# Patient Record
Sex: MALE | Race: WHITE | NOT HISPANIC OR LATINO | Employment: FULL TIME | URBAN - METROPOLITAN AREA
[De-identification: names, ages, dates, MRNs, and addresses within clinical notes are randomized per-mention and may not be internally consistent; named-entity substitution may affect disease eponyms.]

---

## 2017-11-13 ENCOUNTER — OFFICE VISIT (OUTPATIENT)
Dept: MEDICAL GROUP | Facility: MEDICAL CENTER | Age: 32
End: 2017-11-13
Payer: COMMERCIAL

## 2017-11-13 VITALS
DIASTOLIC BLOOD PRESSURE: 100 MMHG | OXYGEN SATURATION: 96 % | HEIGHT: 71 IN | WEIGHT: 229 LBS | HEART RATE: 66 BPM | SYSTOLIC BLOOD PRESSURE: 150 MMHG | TEMPERATURE: 98.3 F | BODY MASS INDEX: 32.06 KG/M2

## 2017-11-13 DIAGNOSIS — D22.9 ATYPICAL NEVUS: ICD-10-CM

## 2017-11-13 DIAGNOSIS — Z76.89 ENCOUNTER TO ESTABLISH CARE: ICD-10-CM

## 2017-11-13 DIAGNOSIS — E66.9 OBESITY (BMI 30-39.9): ICD-10-CM

## 2017-11-13 DIAGNOSIS — M54.32 SCIATICA OF LEFT SIDE: ICD-10-CM

## 2017-11-13 DIAGNOSIS — J06.9 ACUTE URI: ICD-10-CM

## 2017-11-13 PROCEDURE — 99204 OFFICE O/P NEW MOD 45 MIN: CPT | Performed by: PHYSICIAN ASSISTANT

## 2017-11-13 ASSESSMENT — PATIENT HEALTH QUESTIONNAIRE - PHQ9: CLINICAL INTERPRETATION OF PHQ2 SCORE: 0

## 2017-11-13 NOTE — ASSESSMENT & PLAN NOTE
This is a 32-year-old male who is here today to establish care. He has a one-week history of sinus congestion drainage and bilateral ear pain. Associated cough that causes chest pain. Denies any fevers, shortness of breath or chest pain. Has been taking no significant medications to help with the symptoms. Wife wanted him to get checked out.

## 2017-11-13 NOTE — ASSESSMENT & PLAN NOTE
He works as a  at a local high school. Also as a . Graduated from [employer]. Is  without children. Grew up in [place].

## 2017-11-13 NOTE — ASSESSMENT & PLAN NOTE
He complains of a chronic history of sciatica on the left side. Previously he has seen a chiropractor. Symptoms did improve. He does exercises routinely if needed.

## 2017-11-13 NOTE — PROGRESS NOTES
"Subjective:   Bang Lezama is a 32 y.o. male here today for establishing care and for bilateral ear pain and pain when he coughs for one week.    Acute URI  This is a 32-year-old male who is here today to establish care. He has a one-week history of sinus congestion drainage and bilateral ear pain. Associated cough that causes chest pain. Denies any fevers, shortness of breath or chest pain. Has been taking no significant medications to help with the symptoms. Wife wanted him to get checked out.    Sciatica of left side  He complains of a chronic history of sciatica on the left side. Previously he has seen a chiropractor. Symptoms did improve. He does exercises routinely if needed.    Obesity (BMI 30-39.9)  He works as a  at a local high school. Also as a . Graduated from [employer]. Is  without children. Grew up in [place].    Atypical nevus  Requesting a referral to dermatology. Has some skin damage on his shoulders.       Current medicines (including changes today)  No current outpatient prescriptions on file.     No current facility-administered medications for this visit.      He  has no past medical history on file.    ROS   No shortness of breath, no abdominal pain and all other systems were reviewed and are negative.       Objective:     Blood pressure 150/100, pulse 66, temperature 36.8 °C (98.3 °F), height 1.803 m (5' 11\"), weight 103.9 kg (229 lb), SpO2 96 %. Body mass index is 31.94 kg/m².   Physical Exam:  Constitutional: Alert, no distress.  Skin: Warm, dry, good turgor, no rashes in visible areas.No precancerous lesions noted on his left shoulder.  Eye: Equal, round and reactive, conjunctiva clear, lids normal.  ENMT: Lips without lesions, good dentition, oropharynx clear.TMs bilaterally are clear.  Neck: Trachea midline, no masses.   Lymph: No cervical or supraclavicular lymphadenopathy  Respiratory: Unlabored respiratory effort, lungs clear to auscultation, no wheezes, no " jessica.  Cardiovascular: Normal S1, S2, no murmur, no edema.  Abdomen: Soft, non-tender, no masses.  Psych: Alert and oriented x3, normal affect and mood.        Assessment and Plan:   The following treatment plan was discussed    1. Acute URI  Acute, new onset condition. Ears and lungs are clear. Advised to push fluids. May consider over-the-counter medication such as an antihistamine with a nasal decongestant. May take ibuprofen 600-800 mg with food every 8 hours as needed. Advised symptoms a viral process. Not bacterial. Symptoms may last for a couple weeks longer. Follow up with any worsening symptoms such as fevers, shortness of breath or worsening chest pain.    2. Sciatica of left side  Chronic condition and stable. Continue exercises as needed. Contact me with any concerns.    3. Obesity (BMI 30-39.9)  Advised exercise routinely. Lose weight.  - Patient identified as having weight management issue.  Appropriate orders and counseling given.    4. Atypical nevus  No significant precancerous lesions noted on his upper left shoulder. Referred to dermatology per his request.  - REFERRAL TO DERMATOLOGY    5. Encounter to establish care      Followup: Return if symptoms worsen or fail to improve.    Please note that this dictation was created using voice recognition software. I have made every reasonable attempt to correct obvious errors, but I expect that there are errors of grammar and possibly content that I did not discover before finalizing the note.

## 2018-02-26 ENCOUNTER — OFFICE VISIT (OUTPATIENT)
Dept: URGENT CARE | Facility: CLINIC | Age: 33
End: 2018-02-26
Payer: COMMERCIAL

## 2018-02-26 VITALS
DIASTOLIC BLOOD PRESSURE: 86 MMHG | OXYGEN SATURATION: 99 % | TEMPERATURE: 97.1 F | BODY MASS INDEX: 31.8 KG/M2 | RESPIRATION RATE: 16 BRPM | SYSTOLIC BLOOD PRESSURE: 116 MMHG | HEIGHT: 72 IN | HEART RATE: 87 BPM | WEIGHT: 234.79 LBS

## 2018-02-26 DIAGNOSIS — H83.09 LABYRINTHITIS, UNSPECIFIED LATERALITY: ICD-10-CM

## 2018-02-26 PROCEDURE — 99213 OFFICE O/P EST LOW 20 MIN: CPT | Performed by: INTERNAL MEDICINE

## 2018-02-26 RX ORDER — MECLIZINE HYDROCHLORIDE 25 MG/1
25 TABLET ORAL 3 TIMES DAILY PRN
Qty: 30 TAB | Refills: 0 | Status: SHIPPED | OUTPATIENT
Start: 2018-02-26 | End: 2018-07-04

## 2018-02-26 ASSESSMENT — ENCOUNTER SYMPTOMS
NAUSEA: 0
TREMORS: 0
HEADACHES: 0
CONSTITUTIONAL NEGATIVE: 1
VOMITING: 0
DIARRHEA: 0
LOSS OF CONSCIOUSNESS: 0
FOCAL WEAKNESS: 0
SEIZURES: 0
PALPITATIONS: 0
SORE THROAT: 0
FEVER: 0
SHORTNESS OF BREATH: 0
MYALGIAS: 0
SINUS PAIN: 0
SPEECH CHANGE: 0
COUGH: 0
SENSORY CHANGE: 0
STRIDOR: 0
CHILLS: 0
TINGLING: 0
EYES NEGATIVE: 1
WEIGHT LOSS: 0
BLOOD IN STOOL: 0
DIZZINESS: 1

## 2018-02-26 NOTE — PROGRESS NOTES
Bang Lezama is a 33 y.o. male who presents for Otalgia (LT ear) and Loss Of Balance (started yesterday)       Patient is a very pleasant 33-year-old male who presents today with acute onset of dizziness. Patient states he awoke this morning got out of bed and lost his balance and stepped on his dog. States that he has noticed some ringing in his ears. No recent travel no recent flying. Patient denies any discharge from any of his ears. No fever shakes or chills. No nausea vomiting diarrhea.      PMH:  has no past medical history on file.  MEDS:   Current Outpatient Prescriptions:   •  meclizine (ANTIVERT) 25 MG Tab, Take 1 Tab by mouth 3 times a day as needed for Dizziness or Nausea/Vomiting., Disp: 30 Tab, Rfl: 0  ALLERGIES: No Known Allergies  SURGHX: No past surgical history on file.  SOCHX:  reports that he has never smoked. His smokeless tobacco use includes Chew. He reports that he drinks about 6.0 oz of alcohol per week . He reports that he uses drugs, including Marijuana.  FH: family history is not on file.    Review of Systems   Constitutional: Negative.  Negative for chills, fever and weight loss.   HENT: Positive for tinnitus. Negative for congestion, ear discharge, ear pain, nosebleeds, sinus pain and sore throat.    Eyes: Negative.    Respiratory: Negative for cough, shortness of breath and stridor.    Cardiovascular: Negative for chest pain, palpitations and leg swelling.   Gastrointestinal: Negative for blood in stool, diarrhea, nausea and vomiting.   Genitourinary: Negative for dysuria.   Musculoskeletal: Negative for myalgias.   Skin: Negative for rash.   Neurological: Positive for dizziness (negative headache). Negative for tingling, tremors, sensory change, speech change, focal weakness, seizures, loss of consciousness and headaches.     No Known Allergies   Objective:   /86   Pulse 87   Temp 36.2 °C (97.1 °F)   Resp 16   Ht 1.829 m (6')   Wt 106.5 kg (234 lb 12.6 oz)   SpO2 99%    BMI 31.84 kg/m²   Physical Exam   Constitutional: He is oriented to person, place, and time. He appears well-developed and well-nourished. He is active. No distress.   HENT:   Head: Normocephalic and atraumatic.   Right Ear: External ear normal.   Left Ear: External ear normal.   Mouth/Throat: Oropharynx is clear and moist. No oropharyngeal exudate.   Eyes: Conjunctivae and EOM are normal. Pupils are equal, round, and reactive to light. Right eye exhibits no discharge. Left eye exhibits no discharge. No scleral icterus.   Neck: Normal range of motion. Neck supple. No JVD present. Carotid bruit is not present. No thyroid mass and no thyromegaly present.   Cardiovascular: Normal rate, regular rhythm, S1 normal, S2 normal and normal heart sounds.  Exam reveals no friction rub.    No murmur heard.  Pulmonary/Chest: Effort normal and breath sounds normal. No respiratory distress. He has no wheezes. He has no rales.   Abdominal: Soft. Bowel sounds are normal. He exhibits no distension and no mass. There is no hepatosplenomegaly. There is no tenderness. There is no rebound and no guarding.   Musculoskeletal: Normal range of motion. He exhibits no edema.        Cervical back: Normal.   Lymphadenopathy:        Head (right side): No submental, no submandibular and no occipital adenopathy present.        Head (left side): No submental, no submandibular and no occipital adenopathy present.     He has no cervical adenopathy.   Neurological: He is alert and oriented to person, place, and time. He has normal strength. No cranial nerve deficit.   Skin: Skin is warm and dry. No rash noted. No erythema.   Psychiatric: He has a normal mood and affect. His behavior is normal. Thought content normal.         Assessment/Plan:   Assessment    1. Labyrinthitis, unspecified laterality  - meclizine (ANTIVERT) 25 MG Tab; Take 1 Tab by mouth 3 times a day as needed for Dizziness or Nausea/Vomiting.  Dispense: 30 Tab; Refill: 0  Differential  diagnosis, natural history, supportive care, and indications for immediate follow-up discussed.    By mouth fluids, Tylenol when necessary  Follow-up with PCP  Return to clinic for worsening symptoms.

## 2018-07-04 ENCOUNTER — TELEPHONE (OUTPATIENT)
Dept: URGENT CARE | Facility: CLINIC | Age: 33
End: 2018-07-04

## 2018-07-04 ENCOUNTER — OFFICE VISIT (OUTPATIENT)
Dept: URGENT CARE | Facility: CLINIC | Age: 33
End: 2018-07-04
Payer: COMMERCIAL

## 2018-07-04 ENCOUNTER — HOSPITAL ENCOUNTER (OUTPATIENT)
Dept: RADIOLOGY | Facility: MEDICAL CENTER | Age: 33
End: 2018-07-04
Attending: NURSE PRACTITIONER
Payer: COMMERCIAL

## 2018-07-04 VITALS
DIASTOLIC BLOOD PRESSURE: 100 MMHG | SYSTOLIC BLOOD PRESSURE: 140 MMHG | TEMPERATURE: 96.9 F | RESPIRATION RATE: 18 BRPM | HEART RATE: 72 BPM | WEIGHT: 238.2 LBS | OXYGEN SATURATION: 98 % | BODY MASS INDEX: 32.26 KG/M2 | HEIGHT: 72 IN

## 2018-07-04 DIAGNOSIS — R10.9 FLANK PAIN, ACUTE: ICD-10-CM

## 2018-07-04 DIAGNOSIS — R11.0 NAUSEA: ICD-10-CM

## 2018-07-04 DIAGNOSIS — R31.9 HEMATURIA, UNSPECIFIED TYPE: ICD-10-CM

## 2018-07-04 DIAGNOSIS — N20.0 KIDNEY STONE ON RIGHT SIDE: ICD-10-CM

## 2018-07-04 LAB
APPEARANCE UR: CLEAR
BILIRUB UR STRIP-MCNC: NORMAL MG/DL
COLOR UR AUTO: NORMAL
GLUCOSE UR STRIP.AUTO-MCNC: NORMAL MG/DL
KETONES UR STRIP.AUTO-MCNC: NORMAL MG/DL
LEUKOCYTE ESTERASE UR QL STRIP.AUTO: NORMAL
NITRITE UR QL STRIP.AUTO: NORMAL
PH UR STRIP.AUTO: 5 [PH] (ref 5–8)
PROT UR QL STRIP: 30 MG/DL
RBC UR QL AUTO: NORMAL
SP GR UR STRIP.AUTO: 1
UROBILINOGEN UR STRIP-MCNC: NORMAL MG/DL

## 2018-07-04 PROCEDURE — 81002 URINALYSIS NONAUTO W/O SCOPE: CPT | Performed by: NURSE PRACTITIONER

## 2018-07-04 PROCEDURE — 99214 OFFICE O/P EST MOD 30 MIN: CPT | Performed by: NURSE PRACTITIONER

## 2018-07-04 PROCEDURE — 74176 CT ABD & PELVIS W/O CONTRAST: CPT

## 2018-07-04 RX ORDER — ONDANSETRON 4 MG/1
4 TABLET, ORALLY DISINTEGRATING ORAL EVERY 8 HOURS PRN
Qty: 10 TAB | Refills: 0 | Status: SHIPPED | OUTPATIENT
Start: 2018-07-04 | End: 2022-10-11

## 2018-07-04 RX ORDER — TAMSULOSIN HYDROCHLORIDE 0.4 MG/1
0.4 CAPSULE ORAL
Qty: 30 CAP | Refills: 0 | Status: SHIPPED | OUTPATIENT
Start: 2018-07-04 | End: 2018-07-04 | Stop reason: SDUPTHER

## 2018-07-04 RX ORDER — HYDROCODONE BITARTRATE AND ACETAMINOPHEN 5; 325 MG/1; MG/1
1-2 TABLET ORAL EVERY 4 HOURS PRN
Qty: 15 TAB | Refills: 0 | Status: SHIPPED | OUTPATIENT
Start: 2018-07-04 | End: 2018-07-07

## 2018-07-04 RX ORDER — TAMSULOSIN HYDROCHLORIDE 0.4 MG/1
0.4 CAPSULE ORAL
Qty: 30 CAP | Refills: 0 | Status: SHIPPED | OUTPATIENT
Start: 2018-07-04 | End: 2022-10-11

## 2018-07-04 ASSESSMENT — ENCOUNTER SYMPTOMS
SORE THROAT: 0
FLANK PAIN: 0
WEAKNESS: 0
MYALGIAS: 0
EYE PAIN: 0
VOMITING: 0
FEVER: 0
NAUSEA: 0
BACK PAIN: 1
SHORTNESS OF BREATH: 0
NUMBNESS: 0
CHILLS: 0
DIZZINESS: 0

## 2018-07-04 NOTE — PROGRESS NOTES
Subjective:   Bang Lezama is a 33 y.o. male who presents for Blood in Urine (woke up with it )  Patient presents to clinic today with complaints of blood in his urine ×1 day. Patient denies any dysuria, discharge, STD exposure. Patient is having mild back pain however contributes to his mattress.  Denies any fever, chills. He is feeling nauseous without vomiting.  Patient denies any history of kidney stones.      Back Pain   This is a new problem. The current episode started yesterday. The problem occurs constantly. The problem is unchanged. Pain location: left/right flank  The quality of the pain is described as shooting. The pain does not radiate. The pain is at a severity of 4/10. The pain is mild. The pain is the same all the time. Exacerbated by: nothing. Stiffness is present all day. Pertinent negatives include no chest pain, dysuria, fever, numbness, pelvic pain or weakness. (Blood in urine  ) He has tried nothing for the symptoms. The treatment provided no relief.     Review of Systems   Constitutional: Negative for chills and fever.   HENT: Negative for sore throat.    Eyes: Negative for pain.   Respiratory: Negative for shortness of breath.    Cardiovascular: Negative for chest pain.   Gastrointestinal: Negative for nausea and vomiting.   Genitourinary: Positive for hematuria. Negative for dysuria, flank pain, pelvic pain and urgency.   Musculoskeletal: Positive for back pain. Negative for myalgias.   Skin: Negative for rash.   Neurological: Negative for dizziness, weakness and numbness.     No Known Allergies   Objective:   /100   Pulse 72   Temp 36.1 °C (96.9 °F)   Resp 18   Ht 1.829 m (6')   Wt 108 kg (238 lb 3.2 oz)   SpO2 98%   BMI 32.31 kg/m²   Physical Exam   Constitutional: He is oriented to person, place, and time. He appears well-developed and well-nourished. No distress.   HENT:   Head: Normocephalic and atraumatic.   Eyes: Conjunctivae and EOM are normal. Pupils are equal,  round, and reactive to light.   Cardiovascular: Normal rate and regular rhythm.    No murmur heard.  Pulmonary/Chest: Effort normal and breath sounds normal. No respiratory distress.   Abdominal: Soft. He exhibits no distension. There is no tenderness. There is CVA tenderness (left/ right).   Neurological: He is alert and oriented to person, place, and time. He has normal reflexes. No sensory deficit.   Skin: Skin is warm and dry.   Psychiatric: He has a normal mood and affect.         Assessment/Plan:   Assessment    1. Hematuria, unspecified type  2. Flank pain, acute  3. Kidney stone on right side  -4. Nausea   - ondansetron (ZOFRAN ODT) 4 MG TABLET DISPERSIBLE; Take 1 Tab by mouth every 8 hours as needed.  Dispense: 10 Tab; Refill: 0  - CT-RENAL COLIC EVALUATION(A/P W/O); Future  - POCT Urinalysis  - tamsulosin (FLOMAX) 0.4 MG capsule; Take 1 Cap by mouth ONE-HALF HOUR AFTER BREAKFAST.  Dispense: 30 Cap; Refill: 0  - HYDROcodone-acetaminophen (NORCO) 5-325 MG Tab per tablet; Take 1-2 Tabs by mouth every four hours as needed for up to 3 days.  Dispense: 15 Tab; Refill: 0  - Consent for Opiate Prescription  - REFERRAL TO UROLOGY    Patient with flank pain. Will follow up with pending CT results and treat as indicated.     CT results:  1.  There is a nonobstructive 6 mm right UPJ calcification      Will assist patient in passing of stone and follow up with urology. Advised if fever develops, severe pain, if not able to urinate for 8-12 hours must got to ER.     [employer] query was performed to review the . The patient appears appropriate to receive medication as prescribed.        Patient given precautionary s/sx that mandate immediate follow up and evaluation in the ED. Advised of risks of not doing so.    DDX, Supportive care, and indications for immediate follow-up discussed with patient.    Instructed to return to clinic or nearest emergency department if we are not available for any change in condition,  further concerns, or worsening of symptoms.    The patient demonstrated a good understanding and agreed with the treatment plan.

## 2018-07-19 ENCOUNTER — OFFICE VISIT (OUTPATIENT)
Dept: URGENT CARE | Facility: CLINIC | Age: 33
End: 2018-07-19
Payer: COMMERCIAL

## 2018-07-19 VITALS
OXYGEN SATURATION: 97 % | DIASTOLIC BLOOD PRESSURE: 72 MMHG | SYSTOLIC BLOOD PRESSURE: 118 MMHG | HEIGHT: 72 IN | WEIGHT: 231 LBS | TEMPERATURE: 97.2 F | BODY MASS INDEX: 31.29 KG/M2 | HEART RATE: 70 BPM | RESPIRATION RATE: 16 BRPM

## 2018-07-19 DIAGNOSIS — R10.9 ABDOMINAL DISCOMFORT: ICD-10-CM

## 2018-07-19 DIAGNOSIS — Z87.442 HISTORY OF KIDNEY STONES: ICD-10-CM

## 2018-07-19 LAB
APPEARANCE UR: CLEAR
BILIRUB UR STRIP-MCNC: NEGATIVE MG/DL
COLOR UR AUTO: NORMAL
GLUCOSE UR STRIP.AUTO-MCNC: NEGATIVE MG/DL
KETONES UR STRIP.AUTO-MCNC: NEGATIVE MG/DL
LEUKOCYTE ESTERASE UR QL STRIP.AUTO: NEGATIVE
NITRITE UR QL STRIP.AUTO: NEGATIVE
PH UR STRIP.AUTO: 6.5 [PH] (ref 5–8)
PROT UR QL STRIP: NORMAL MG/DL
RBC UR QL AUTO: NORMAL
SP GR UR STRIP.AUTO: 1.01
UROBILINOGEN UR STRIP-MCNC: NEGATIVE MG/DL

## 2018-07-19 PROCEDURE — 81002 URINALYSIS NONAUTO W/O SCOPE: CPT | Performed by: PHYSICIAN ASSISTANT

## 2018-07-19 PROCEDURE — 99214 OFFICE O/P EST MOD 30 MIN: CPT | Performed by: PHYSICIAN ASSISTANT

## 2018-07-19 RX ORDER — ONDANSETRON 4 MG/1
4 TABLET, FILM COATED ORAL EVERY 6 HOURS PRN
Qty: 20 TAB | Refills: 1 | Status: SHIPPED | OUTPATIENT
Start: 2018-07-19 | End: 2022-10-11

## 2018-07-19 ASSESSMENT — ENCOUNTER SYMPTOMS
COUGH: 0
BACK PAIN: 0
NAUSEA: 0
ABDOMINAL PAIN: 0
FEVER: 0
MYALGIAS: 0
FLANK PAIN: 0
SORE THROAT: 0
VOMITING: 0
WHEEZING: 0
SPUTUM PRODUCTION: 0
SHORTNESS OF BREATH: 0
CHILLS: 0
DIARRHEA: 0

## 2018-07-19 NOTE — PROGRESS NOTES
"Subjective:   Bang Lezama is a 33 y.o. male who presents for Abdominal Pain (abdominal pain lower right side x 1 day)        Abdominal Pain   This is a new problem. The current episode started today. The pain is located in the RLQ. Pertinent negatives include no diarrhea, dysuria, fever, frequency, hematuria, myalgias, nausea ( resolved) or vomiting.   notes had lithotripsy last Tuesday, PMH of kidney stone x last two weeks, noted onset abd pain and nausea vomiting last night similar to w/ prior stone, was curious about appendix due to location of pain, denies PMH of abd surgery, less pain now, much worse in early hours of am, pain awoke from sleep this am, denies dysuria/freq/urg/or gross hematuria now. Has not called to urology today, was told if having emesis or inability to go to restroom to got to MD. Tried using zofran last night w/ good resolution of nausea, denies hematuria. Did have chills last night, darker urine last night. Poor intake of fluids so little urine but tolerating PO intake well. Pt has norco Rx from urology that he will fill today, requests more zofran. Declines need for work note.     Review of Systems   Constitutional: Negative for chills and fever.   HENT: Negative for congestion, ear pain and sore throat.    Respiratory: Negative for cough, sputum production, shortness of breath and wheezing.    Gastrointestinal: Negative for abdominal pain ( largely resolved now), diarrhea, nausea ( resolved) and vomiting.   Genitourinary: Negative for dysuria, flank pain, frequency, hematuria and urgency.   Musculoskeletal: Negative for back pain and myalgias.   Skin: Negative for rash.     No Known Allergies   Objective:   /72   Pulse 70   Temp 36.2 °C (97.2 °F)   Resp 16   Ht 1.829 m (6' 0.01\")   Wt 104.8 kg (231 lb)   SpO2 97%   BMI 31.32 kg/m²   Physical Exam   Constitutional: He is oriented to person, place, and time. He appears well-developed and well-nourished. No distress. "   HENT:   Head: Normocephalic and atraumatic.   Right Ear: External ear normal.   Left Ear: External ear normal.   Nose: Nose normal.   Eyes: Conjunctivae are normal. Right eye exhibits no discharge. Left eye exhibits no discharge. No scleral icterus.   Neck: Neck supple.   Pulmonary/Chest: Effort normal and breath sounds normal. No respiratory distress. He has no wheezes. He has no rales.   Abdominal: Soft. Bowel sounds are normal. He exhibits no distension and no mass. There is no tenderness ( none elicited w/ palpation, normal abd exam). There is no rebound and no guarding. No hernia.   Musculoskeletal: Normal range of motion.   Neurological: He is alert and oriented to person, place, and time. Coordination normal.   Skin: Skin is warm and dry. He is not diaphoretic. No pallor.   Psychiatric: He has a normal mood and affect.   Nursing note and vitals reviewed.  POCT UA -   POC Color Dark Yellow  Negative Final   POC Appearance Clear  Negative Final   POC Leukocyte Esterase Negative  Negative Final   POC Nitrites Negative  Negative Final   POC Urobiligen Negative  Negative (0.2) mg/dL Final   POC Protein Trace  Negative mg/dL Final   POC Urine PH 6.5  5.0 - 8.0 Final   POC Blood Large  Negative Final   POC Specific Gravity 1.010  <1.005 - >1.030 Final   POC Ketones Negative  Negative mg/dL Final   POC Bilirubin Negative  Negative mg/dL Final   POC Glucose Negative  Negative mg/dL Final           Assessment/Plan:   Assessment    1. Abdominal discomfort  - ondansetron (ZOFRAN) 4 MG Tab tablet; Take 1 Tab by mouth every 6 hours as needed for Nausea/Vomiting.  Dispense: 20 Tab; Refill: 1  - POCT Urinalysis    2. History of kidney stones    Supportive care is reviewed with patient/caregiver - recommend to push PO fluids and electrolytes, zofran to push fluids, no concerns for other abd processes w/ normal abd exam now - encouraged to contact urology office for guidance on normal post lithotripsy pain and symptoms -  Return to clinic with lack of resolution or progression of symptoms.    ER precautions with any worsening symptoms are reviewed with patient/caregiver and they do express understanding        Differential diagnosis, natural history, supportive care, and indications for immediate follow-up discussed.

## 2020-02-13 ENCOUNTER — OFFICE VISIT (OUTPATIENT)
Dept: URGENT CARE | Facility: PHYSICIAN GROUP | Age: 35
End: 2020-02-13
Payer: COMMERCIAL

## 2020-02-13 VITALS
BODY MASS INDEX: 31.83 KG/M2 | DIASTOLIC BLOOD PRESSURE: 80 MMHG | HEIGHT: 72 IN | OXYGEN SATURATION: 97 % | RESPIRATION RATE: 18 BRPM | SYSTOLIC BLOOD PRESSURE: 120 MMHG | TEMPERATURE: 98.1 F | HEART RATE: 80 BPM | WEIGHT: 235 LBS

## 2020-02-13 DIAGNOSIS — J02.8 ACUTE PHARYNGITIS DUE TO OTHER SPECIFIED ORGANISMS: ICD-10-CM

## 2020-02-13 DIAGNOSIS — J06.9 URI, ACUTE: ICD-10-CM

## 2020-02-13 LAB
INT CON NEG: NORMAL
INT CON POS: NORMAL
S PYO AG THROAT QL: NEGATIVE

## 2020-02-13 PROCEDURE — 87880 STREP A ASSAY W/OPTIC: CPT | Performed by: INTERNAL MEDICINE

## 2020-02-13 PROCEDURE — 99214 OFFICE O/P EST MOD 30 MIN: CPT | Performed by: INTERNAL MEDICINE

## 2020-02-13 RX ORDER — IBUPROFEN 200 MG
200 TABLET ORAL EVERY 6 HOURS PRN
COMMUNITY

## 2020-02-13 ASSESSMENT — ENCOUNTER SYMPTOMS
SHORTNESS OF BREATH: 0
VOMITING: 0
TROUBLE SWALLOWING: 0
COUGH: 1

## 2020-02-13 NOTE — PROGRESS NOTES
Subjective:   Bang Lezama is a 34 y.o. male who presents for Sore Throat (sore throat, cough x1 day )        Pharyngitis    This is a new problem. The current episode started yesterday. The problem has been unchanged. There has been no fever. The pain is mild. Associated symptoms include congestion and coughing. Pertinent negatives include no shortness of breath, trouble swallowing or vomiting.     Review of Systems   HENT: Positive for congestion. Negative for trouble swallowing.    Respiratory: Positive for cough. Negative for shortness of breath.    Gastrointestinal: Negative for vomiting.   All other systems reviewed and are negative.    No Known Allergies   Objective:   /80 (BP Location: Right arm, Patient Position: Sitting, BP Cuff Size: Adult)   Pulse 80   Temp 36.7 °C (98.1 °F) (Temporal)   Resp 18   Ht 1.829 m (6')   Wt 106.6 kg (235 lb)   SpO2 97%   BMI 31.87 kg/m²   Physical Exam  Vitals signs reviewed.   Constitutional:       General: He is not in acute distress.     Appearance: He is well-developed.   HENT:      Head: Normocephalic and atraumatic.      Right Ear: Tympanic membrane, ear canal and external ear normal.      Left Ear: Tympanic membrane, ear canal and external ear normal.      Nose: Mucosal edema and rhinorrhea present.      Mouth/Throat:      Mouth: Mucous membranes are moist.      Pharynx: Oropharynx is clear. Uvula midline. Posterior oropharyngeal erythema present. No oropharyngeal exudate.      Tonsils: No tonsillar abscesses.   Eyes:      Conjunctiva/sclera: Conjunctivae normal.   Neck:      Musculoskeletal: No neck rigidity.   Cardiovascular:      Rate and Rhythm: Normal rate and regular rhythm.   Pulmonary:      Effort: Pulmonary effort is normal. No respiratory distress.      Breath sounds: Normal breath sounds.   Lymphadenopathy:      Cervical: No cervical adenopathy.   Skin:     General: Skin is warm and dry.      Capillary Refill: Capillary refill takes less than  2 seconds.   Neurological:      Mental Status: He is alert and oriented to person, place, and time.      Sensory: No sensory deficit.      Deep Tendon Reflexes: Reflexes are normal and symmetric.   Psychiatric:         Mood and Affect: Mood normal.         Behavior: Behavior normal.           Assessment/Plan:   1. Acute pharyngitis due to other specified organisms  - POCT Rapid Strep A    2. URI, acute    Other orders  - ibuprofen (MOTRIN) 200 MG Tab; Take 200 mg by mouth every 6 hours as needed.  - Phenylephrine-Pheniramine-DM (THERAFLU COLD & COUGH PO); Take  by mouth.    Advised patient to take 600 mg of ibuprofen 4 times a day with food as needed increase fluid intake, zinc, vitamin C    High risk conditions including peritonsillar abscess, flu, mastoiditis was considered in the differential diagnosis  Differential diagnosis, natural history, supportive care, and indications for immediate follow-up discussed.

## 2021-01-28 ENCOUNTER — HOSPITAL ENCOUNTER (OUTPATIENT)
Facility: MEDICAL CENTER | Age: 36
End: 2021-01-28
Attending: PHYSICIAN ASSISTANT
Payer: COMMERCIAL

## 2021-01-28 ENCOUNTER — OFFICE VISIT (OUTPATIENT)
Dept: URGENT CARE | Facility: PHYSICIAN GROUP | Age: 36
End: 2021-01-28
Payer: COMMERCIAL

## 2021-01-28 VITALS
OXYGEN SATURATION: 95 % | SYSTOLIC BLOOD PRESSURE: 124 MMHG | HEIGHT: 72 IN | HEART RATE: 73 BPM | BODY MASS INDEX: 32.51 KG/M2 | DIASTOLIC BLOOD PRESSURE: 76 MMHG | WEIGHT: 240 LBS | RESPIRATION RATE: 14 BRPM | TEMPERATURE: 95.9 F

## 2021-01-28 DIAGNOSIS — J02.9 SORE THROAT: ICD-10-CM

## 2021-01-28 LAB — COVID ORDER STATUS COVID19: NORMAL

## 2021-01-28 PROCEDURE — U0005 INFEC AGEN DETEC AMPLI PROBE: HCPCS

## 2021-01-28 PROCEDURE — U0003 INFECTIOUS AGENT DETECTION BY NUCLEIC ACID (DNA OR RNA); SEVERE ACUTE RESPIRATORY SYNDROME CORONAVIRUS 2 (SARS-COV-2) (CORONAVIRUS DISEASE [COVID-19]), AMPLIFIED PROBE TECHNIQUE, MAKING USE OF HIGH THROUGHPUT TECHNOLOGIES AS DESCRIBED BY CMS-2020-01-R: HCPCS

## 2021-01-28 PROCEDURE — 99213 OFFICE O/P EST LOW 20 MIN: CPT | Performed by: PHYSICIAN ASSISTANT

## 2021-01-28 SDOH — HEALTH STABILITY: MENTAL HEALTH: HOW OFTEN DO YOU HAVE 6 OR MORE DRINKS ON ONE OCCASION?: LESS THAN MONTHLY

## 2021-01-28 SDOH — HEALTH STABILITY: MENTAL HEALTH: HOW OFTEN DO YOU HAVE A DRINK CONTAINING ALCOHOL?: 2-3 TIMES A WEEK

## 2021-01-28 SDOH — HEALTH STABILITY: MENTAL HEALTH: HOW MANY STANDARD DRINKS CONTAINING ALCOHOL DO YOU HAVE ON A TYPICAL DAY?: 5 OR 6

## 2021-01-28 ASSESSMENT — ENCOUNTER SYMPTOMS
STRIDOR: 0
SPUTUM PRODUCTION: 0
CHILLS: 0
WHEEZING: 0
FEVER: 0
EYE PAIN: 0
SHORTNESS OF BREATH: 0
EYE DISCHARGE: 0
EYE REDNESS: 0
HEADACHES: 1
SORE THROAT: 1
HEMOPTYSIS: 0
COUGH: 1
PALPITATIONS: 0

## 2021-01-28 NOTE — PROGRESS NOTES
Subjective:   Bang Lezama is a 35 y.o. male who presents for Pharyngitis (sx started two days with diarrhea, headache, and sore throat. 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 The current episode started in the past 7 days. The problem has been unchanged. The pain is moderate. Associated symptoms include congestion, coughing, ear pain and headaches. Pertinent negatives include no ear discharge, shortness of breath or stridor.       Review of Systems   Constitutional: Positive for malaise/fatigue. Negative for chills and fever.   HENT: Positive for congestion, ear pain and sore throat. Negative for ear discharge.    Eyes: Negative for pain, discharge and redness.   Respiratory: Positive for cough. Negative for hemoptysis, sputum production, shortness of breath, wheezing and stridor.    Cardiovascular: Negative for chest pain and palpitations.   Skin: Negative for itching and rash.   Neurological: Positive for headaches.   All other systems reviewed and are negative.      Medications:    • ibuprofen Tabs  • ondansetron Tabs  • ondansetron Tbdp  • tamsulosin  • THERAFLU COLD & COUGH PO    Allergies: Patient has no known allergies.    Problem List: Bang Lezama has Acute URI; Sciatica of left side; Obesity (BMI 30-39.9); and Atypical nevus on their problem list.    Surgical History:  No past surgical history on file.    Past Social Hx: Bang Lezama  reports that he has never smoked. He has quit using smokeless tobacco.  His smokeless tobacco use included chew. He reports current alcohol use of about 6.0 oz of alcohol per week.     Past Family Hx:  Bang Lezama family history is not on file.     Problem list, medications, and allergies reviewed by myself today in Epic.     Objective:     Blood Pressure 124/76 (BP Location: Right arm, Patient Position: Sitting, BP Cuff Size: Adult)   Pulse 73   Temperature (Abnormal) 35.5 °C (95.9 °F)  (Temporal)   Respiration 14   Height 1.829 m (6')   Weight 109 kg (240 lb)   Oxygen Saturation 95%   Body Mass Index 32.55 kg/m²     Physical Exam  Vitals signs reviewed.   Constitutional:       General: He is not in acute distress.     Appearance: He is well-developed. He is not ill-appearing or toxic-appearing.      Interventions: He is not intubated.  HENT:      Head: Normocephalic and atraumatic.      Right Ear: Hearing, tympanic membrane, ear canal and external ear normal.      Left Ear: Hearing, tympanic membrane, ear canal and external ear normal.      Nose: Nose normal.      Mouth/Throat:      Pharynx: Uvula midline.   Eyes:      General: Lids are normal.      Conjunctiva/sclera: Conjunctivae normal.   Neck:      Musculoskeletal: Full passive range of motion without pain, normal range of motion and neck supple.   Cardiovascular:      Rate and Rhythm: Regular rhythm.      Heart sounds: Normal heart sounds, S1 normal and S2 normal. No murmur. No friction rub. No gallop.    Pulmonary:      Effort: Pulmonary effort is normal. No tachypnea, bradypnea, accessory muscle usage or respiratory distress. He is not intubated.      Breath sounds: Normal breath sounds. No decreased breath sounds, wheezing, rhonchi or rales.   Chest:      Chest wall: No tenderness.   Musculoskeletal: Normal range of motion.   Skin:     General: Skin is warm and dry.   Neurological:      Mental Status: He is alert and oriented to person, place, and time.   Psychiatric:         Speech: Speech normal.         Behavior: Behavior normal.         Thought Content: Thought content normal.         Judgment: Judgment normal.         Assessment/Plan:     Medical Decision Making/Comments     Pt is a 35 yr old male who presents for evaluation of possible Covid-19 infection.  Pt states possible exposure. Complains of sore throat and headache.  Vitals and exam unremarkable.  Pt declines strep test.            Diagnosis and associated orders     1.  Sore throat  SARS-CoV-2 PCR (24 hour In-House): Collect NP swab in VTM   - Warm salt water gargles  - NSAIDs/Acetamenopohen PRN  - Throat lozenges PRN  - Cool mist humidifier  - Increase hydration/solid diet as tolerated               Differential diagnosis, natural history, supportive care, and indications for immediate follow-up discussed.    Advised the patient to follow-up with the primary care physician for recheck, reevaluation, and consideration of further management.    Please note that this dictation was created using voice recognition software. I have made a reasonable attempt to correct obvious errors, but I expect that there are errors of grammar and possibly content that I did not discover before finalizing the note.

## 2021-01-29 LAB
SARS-COV-2 RNA RESP QL NAA+PROBE: NOTDETECTED
SPECIMEN SOURCE: NORMAL

## 2022-10-11 ENCOUNTER — OFFICE VISIT (OUTPATIENT)
Dept: MEDICAL GROUP | Facility: MEDICAL CENTER | Age: 37
End: 2022-10-11
Payer: COMMERCIAL

## 2022-10-11 VITALS
RESPIRATION RATE: 16 BRPM | OXYGEN SATURATION: 98 % | TEMPERATURE: 98.8 F | HEIGHT: 72 IN | BODY MASS INDEX: 29.8 KG/M2 | HEART RATE: 77 BPM | DIASTOLIC BLOOD PRESSURE: 80 MMHG | WEIGHT: 220 LBS | SYSTOLIC BLOOD PRESSURE: 122 MMHG

## 2022-10-11 DIAGNOSIS — J02.9 SORE THROAT: ICD-10-CM

## 2022-10-11 DIAGNOSIS — J02.0 STREP PHARYNGITIS: ICD-10-CM

## 2022-10-11 PROCEDURE — 99213 OFFICE O/P EST LOW 20 MIN: CPT | Performed by: STUDENT IN AN ORGANIZED HEALTH CARE EDUCATION/TRAINING PROGRAM

## 2022-10-11 RX ORDER — PENICILLIN V POTASSIUM 500 MG/1
500 TABLET ORAL 3 TIMES DAILY
Qty: 30 TABLET | Refills: 0 | Status: SHIPPED | OUTPATIENT
Start: 2022-10-11 | End: 2022-10-21

## 2022-10-11 NOTE — LETTER
2022    To Whom It May Concern:         This is confirmation that Bang Lezama (: 1985) attended his scheduled appointment with Fernandez Howard D.O. on 10/11/22.    Please excuse patient from work duties for 10/11/2022-10/12/2022 due to acute medical condition. He may return to work on 10/13/2022 without restriction as long as he wears face mask.         If you have any questions please do not hesitate to call me at the phone number listed below.    Sincerely,          Fernandez Howard D.O.  [phone]

## 2022-10-11 NOTE — PROGRESS NOTES
Subjective:     CC: sore throat    HPI:   Bang presents today for sore throat    Problem   Sore Throat    Acute condition.    Date of symptoms onset: 10/7/2022  Symptoms: sore throat, left ear congestion, dry cough (worse at night)  Denies fever, chest pain, shortness of breath.  Date of COVID positive test: none  Medications tried: OTC Robitussin with mild relief  He has been able to tolerate PO but with reduced appetite. Normal urination.    COVID vaccination status: Pfizer COVID #2 received 02/2021  Potential exposure: none     Strep Pharyngitis    Acute condition.         Current Outpatient Medications Ordered in Epic   Medication Sig Dispense Refill    penicillin v potassium (VEETID) 500 MG Tab Take 1 Tablet by mouth 3 times a day for 10 days. 30 Tablet 0    ibuprofen (MOTRIN) 200 MG Tab Take 200 mg by mouth every 6 hours as needed.       No current Epic-ordered facility-administered medications on file.     ROS:  Gen: no fevers/chills  ENT: + sore throat  Pulm: no sob, + cough  CV: no chest pain, no palpitations  GI: no nausea/vomiting, no diarrhea  MSk: no myalgias  Skin: no rash  Neuro: no headaches    Objective:     Exam:  /80 (BP Location: Left arm, Patient Position: Sitting, BP Cuff Size: Adult)   Pulse 77   Temp 37.1 °C (98.8 °F) (Temporal)   Resp 16   Ht 1.829 m (6')   Wt 99.8 kg (220 lb)   SpO2 98%   BMI 29.84 kg/m²  Body mass index is 29.84 kg/m².    Gen: Alert and oriented, No apparent distress.  HEENT: Normocephalic. Nasal mucosa benign, oropharynx with erythema and right sided tonsilllar exudate.  Neck: Neck is supple without lymphadenopathy.  Lungs: Normal effort, CTA bilaterally, no wheezes, rhonchi, or rales  CV: Regular rate and rhythm. No murmurs, rubs, or gallops.  Ext: No clubbing, cyanosis, edema.    Labs:   POC strep positive    Assessment & Plan:     37 y.o. male with the following -     1. Strep pharyngitis  Acute condition, stable.  - Rx penicillin per order, potential  adverse reactions discussed  - cont symptomatic therapy as needed: OTC Tylenol as needed for pain/headache/fever, antihistamine/decongestant as needed for runny nose/congestion. Call or return to clinic prn if these symptoms worsen or fail to improve as anticipated.  - advised to maintain adequate hydration, regular handwashing, facemask, social distancing  - penicillin v potassium (VEETID) 500 MG Tab; Take 1 Tablet by mouth 3 times a day for 10 days.  Dispense: 30 Tablet; Refill: 0    2. Sore throat  - plan as above    Return if symptoms worsen or fail to improve.    Please note that this dictation was created using voice recognition software. I have made every reasonable attempt to correct obvious errors, but I expect that there are errors of grammar and possibly content that I did not discover before finalizing the note.

## 2022-10-22 ENCOUNTER — OFFICE VISIT (OUTPATIENT)
Dept: URGENT CARE | Facility: PHYSICIAN GROUP | Age: 37
End: 2022-10-22
Payer: COMMERCIAL

## 2022-10-22 VITALS
WEIGHT: 228 LBS | HEART RATE: 62 BPM | OXYGEN SATURATION: 96 % | SYSTOLIC BLOOD PRESSURE: 128 MMHG | DIASTOLIC BLOOD PRESSURE: 72 MMHG | HEIGHT: 72 IN | TEMPERATURE: 97.3 F | BODY MASS INDEX: 30.88 KG/M2 | RESPIRATION RATE: 16 BRPM

## 2022-10-22 DIAGNOSIS — H66.004 RECURRENT ACUTE SUPPURATIVE OTITIS MEDIA OF RIGHT EAR WITHOUT SPONTANEOUS RUPTURE OF TYMPANIC MEMBRANE: ICD-10-CM

## 2022-10-22 DIAGNOSIS — J02.9 PHARYNGITIS, UNSPECIFIED ETIOLOGY: ICD-10-CM

## 2022-10-22 PROCEDURE — 99213 OFFICE O/P EST LOW 20 MIN: CPT | Performed by: FAMILY MEDICINE

## 2022-10-22 RX ORDER — LIDOCAINE HYDROCHLORIDE 20 MG/ML
15 SOLUTION OROPHARYNGEAL EVERY 4 HOURS PRN
Qty: 120 ML | Refills: 0 | Status: SHIPPED | OUTPATIENT
Start: 2022-10-22

## 2022-10-22 RX ORDER — CEFDINIR 300 MG/1
300 CAPSULE ORAL 2 TIMES DAILY
Qty: 14 CAPSULE | Refills: 0 | Status: SHIPPED | OUTPATIENT
Start: 2022-10-22 | End: 2022-10-29

## 2022-10-22 ASSESSMENT — ENCOUNTER SYMPTOMS
MYALGIAS: 0
NAUSEA: 0
WEIGHT LOSS: 0
EYE DISCHARGE: 0
VOMITING: 0
EYE REDNESS: 0

## 2022-10-22 NOTE — PROGRESS NOTES
Subjective     Bang Lezama is a 37 y.o. male who presents with Pharyngitis and Otalgia (Mainly right )            Recent diagnosis strep pharyngitis.  He improved on penicillin but no resolution.  In the last few days his sore throat is worsened.  New right earache.  No drainage from ear.  PMH otitis media as an adult.  No recent swimming.  No drainage from ear.  He is tolerating fluids normal urine output.  No fever.  No cough.  No rash.  No other aggravating or alleviating factors.      Review of Systems   Constitutional:  Negative for malaise/fatigue and weight loss.   Eyes:  Negative for discharge and redness.   Gastrointestinal:  Negative for nausea and vomiting.   Musculoskeletal:  Negative for joint pain and myalgias.   Skin:  Negative for itching and rash.            Objective     /72 (BP Location: Right arm, Patient Position: Sitting, BP Cuff Size: Adult)   Pulse 62   Temp 36.3 °C (97.3 °F) (Temporal)   Resp 16   Ht 1.829 m (6')   Wt 103 kg (228 lb)   SpO2 96%   BMI 30.92 kg/m²      Physical Exam  Constitutional:       General: He is not in acute distress.     Appearance: He is well-developed.   HENT:      Head: Normocephalic and atraumatic.      Left Ear: Tympanic membrane normal.      Ears:      Comments: Right TM is red and bulging     Nose: Nose normal.      Mouth/Throat:      Mouth: Mucous membranes are moist.      Pharynx: Posterior oropharyngeal erythema present.   Eyes:      Conjunctiva/sclera: Conjunctivae normal.   Cardiovascular:      Rate and Rhythm: Normal rate and regular rhythm.      Heart sounds: Normal heart sounds. No murmur heard.  Pulmonary:      Effort: Pulmonary effort is normal.      Breath sounds: Normal breath sounds. No wheezing.   Musculoskeletal:      Cervical back: Neck supple.   Lymphadenopathy:      Cervical: Cervical adenopathy present.   Skin:     General: Skin is warm and dry.      Findings: No rash.   Neurological:      Mental Status: He is alert.                            Assessment & Plan        1. Recurrent acute suppurative otitis media of right ear without spontaneous rupture of tympanic membrane    - cefdinir (OMNICEF) 300 MG Cap; Take 1 Capsule by mouth 2 times a day for 7 days.  Dispense: 14 Capsule; Refill: 0  - lidocaine (XYLOCAINE) 2 % Solution; Take 15 mL by mouth every four hours as needed for Throat/Mouth Pain. Gargle and spit every 4 hours as needed  Dispense: 120 mL; Refill: 0    2. Pharyngitis, unspecified etiology    - cefdinir (OMNICEF) 300 MG Cap; Take 1 Capsule by mouth 2 times a day for 7 days.  Dispense: 14 Capsule; Refill: 0  - lidocaine (XYLOCAINE) 2 % Solution; Take 15 mL by mouth every four hours as needed for Throat/Mouth Pain. Gargle and spit every 4 hours as needed  Dispense: 120 mL; Refill: 0     Differential diagnosis, natural history, supportive care, and indications for immediate follow-up discussed at length.

## 2024-11-19 ENCOUNTER — OFFICE VISIT (OUTPATIENT)
Dept: URGENT CARE | Facility: CLINIC | Age: 39
End: 2024-11-19
Payer: COMMERCIAL

## 2024-11-19 VITALS
OXYGEN SATURATION: 97 % | DIASTOLIC BLOOD PRESSURE: 108 MMHG | HEIGHT: 72 IN | HEART RATE: 76 BPM | BODY MASS INDEX: 32.51 KG/M2 | TEMPERATURE: 97.2 F | SYSTOLIC BLOOD PRESSURE: 140 MMHG | RESPIRATION RATE: 16 BRPM | WEIGHT: 240 LBS

## 2024-11-19 DIAGNOSIS — J02.9 PHARYNGITIS, UNSPECIFIED ETIOLOGY: ICD-10-CM

## 2024-11-19 LAB — S PYO DNA SPEC NAA+PROBE: NOT DETECTED

## 2024-11-19 PROCEDURE — 3080F DIAST BP >= 90 MM HG: CPT | Performed by: PHYSICIAN ASSISTANT

## 2024-11-19 PROCEDURE — 99213 OFFICE O/P EST LOW 20 MIN: CPT | Performed by: PHYSICIAN ASSISTANT

## 2024-11-19 PROCEDURE — 87651 STREP A DNA AMP PROBE: CPT | Performed by: PHYSICIAN ASSISTANT

## 2024-11-19 PROCEDURE — 3077F SYST BP >= 140 MM HG: CPT | Performed by: PHYSICIAN ASSISTANT

## 2024-11-19 ASSESSMENT — ENCOUNTER SYMPTOMS
FEVER: 0
COUGH: 0
DIARRHEA: 0
SORE THROAT: 1
CHILLS: 0
ABDOMINAL PAIN: 0
VOMITING: 0
NAUSEA: 0

## 2024-11-20 NOTE — PROGRESS NOTES
Subjective     Bang Lezama is a 39 y.o. male who presents with Sore Throat (X 1 day)    HPI:  Bang Lezama is a 39 y.o. male who presents today for evaluation of sore throat.  Started noticed sore throat around 830 this morning.  Sore throat has been progressing and he also notes some thick nasal discharge.  He has not had any fever.  Has been using throat lozenges, cough drops, throat coat tea.  He says this helps for short periods of time but as soon as it wears off the pain comes right back.  He is concerned about possibility of strep and wants to be evaluated.        Review of Systems   Constitutional:  Negative for chills and fever.   HENT:  Positive for congestion and sore throat.    Respiratory:  Negative for cough.    Gastrointestinal:  Negative for abdominal pain, diarrhea, nausea and vomiting.       PMH:  has no past medical history on file.  MEDS:   Current Outpatient Medications:     ibuprofen (MOTRIN) 200 MG Tab, Take 200 mg by mouth every 6 hours as needed., Disp: , Rfl:     lidocaine (XYLOCAINE) 2 % Solution, Take 15 mL by mouth every four hours as needed for Throat/Mouth Pain. Gargle and spit every 4 hours as needed (Patient not taking: Reported on 11/19/2024), Disp: 120 mL, Rfl: 0  ALLERGIES: No Known Allergies  SURGHX: History reviewed. No pertinent surgical history.  SOCHX:  reports that he has never smoked. He has quit using smokeless tobacco.  His smokeless tobacco use included chew. He reports current alcohol use of about 6.0 oz of alcohol per week. He reports current drug use. Drugs: Inhaled and Marijuana.  FH: Family history was reviewed, no pertinent findings to report      Objective     BP (!) 140/108 (BP Location: Left arm, Patient Position: Sitting, BP Cuff Size: Adult)   Pulse 76   Temp 36.2 °C (97.2 °F) (Temporal)   Resp 16   Ht 1.829 m (6')   Wt 109 kg (240 lb)   SpO2 97%   BMI 32.55 kg/m²      Physical Exam  Constitutional:       Appearance: He is well-developed.   HENT:       Head: Normocephalic and atraumatic.      Right Ear: Tympanic membrane, ear canal and external ear normal.      Left Ear: Tympanic membrane, ear canal and external ear normal.      Nose: Mucosal edema and congestion present. No rhinorrhea.      Mouth/Throat:      Lips: Pink.      Mouth: Mucous membranes are moist.      Pharynx: Uvula midline. Posterior oropharyngeal erythema present. No oropharyngeal exudate or uvula swelling.   Eyes:      Conjunctiva/sclera: Conjunctivae normal.      Pupils: Pupils are equal, round, and reactive to light.   Cardiovascular:      Rate and Rhythm: Normal rate and regular rhythm.      Heart sounds: Normal heart sounds. No murmur heard.  Pulmonary:      Effort: Pulmonary effort is normal.      Breath sounds: Normal breath sounds. No wheezing.   Musculoskeletal:      Cervical back: Normal range of motion.   Lymphadenopathy:      Cervical: Cervical adenopathy present.   Skin:     General: Skin is warm and dry.      Capillary Refill: Capillary refill takes less than 2 seconds.   Neurological:      Mental Status: He is alert and oriented to person, place, and time.   Psychiatric:         Behavior: Behavior normal.         Judgment: Judgment normal.       POCT GROUP A STREP, PCR - Negative      Assessment & Plan     1. Pharyngitis, unspecified etiology  - POCT GROUP A STREP, PCR  PCR strep test negative.  Discussed with patient this is likely onset of a new viral upper respiratory infection.  I think it would be too early to do testing for COVID or influenza given that he has only had symptoms for about 8 hours.  He may continue to progress over the next couple days recommend that he consider doing home testing for COVID or influenza once he has had symptoms for closer to 48 hours.  He can continue to use over-the-counter medications and supportive care measures to treat symptoms.          Differential Diagnosis, natural history, and supportive care discussed. Return to the Urgent Care  or follow up with your PCP if symptoms fail to resolve, or for any new or worsening symptoms. Emergency room precautions discussed. Patient and/or family appears understanding of information.